# Patient Record
Sex: MALE
[De-identification: names, ages, dates, MRNs, and addresses within clinical notes are randomized per-mention and may not be internally consistent; named-entity substitution may affect disease eponyms.]

---

## 2020-06-22 PROBLEM — Z00.00 ENCOUNTER FOR PREVENTIVE HEALTH EXAMINATION: Status: ACTIVE | Noted: 2020-06-22

## 2020-08-12 ENCOUNTER — APPOINTMENT (OUTPATIENT)
Dept: UROLOGY | Facility: CLINIC | Age: 80
End: 2020-08-12
Payer: COMMERCIAL

## 2020-08-12 VITALS — BODY MASS INDEX: 25.16 KG/M2 | HEIGHT: 68 IN | WEIGHT: 166 LBS

## 2020-08-12 DIAGNOSIS — R35.0 FREQUENCY OF MICTURITION: ICD-10-CM

## 2020-08-12 DIAGNOSIS — N40.1 BENIGN PROSTATIC HYPERPLASIA WITH LOWER URINARY TRACT SYMPMS: ICD-10-CM

## 2020-08-12 DIAGNOSIS — N13.8 BENIGN PROSTATIC HYPERPLASIA WITH LOWER URINARY TRACT SYMPMS: ICD-10-CM

## 2020-08-12 DIAGNOSIS — R97.20 ELEVATED PROSTATE, SPECIFIC ANTIGEN [PSA]: ICD-10-CM

## 2020-08-12 DIAGNOSIS — N40.0 BENIGN PROSTATIC HYPERPLASIA WITHOUT LOWER URINARY TRACT SYMPMS: ICD-10-CM

## 2020-08-12 PROCEDURE — 99203 OFFICE O/P NEW LOW 30 MIN: CPT

## 2020-08-12 NOTE — ASSESSMENT
[FreeTextEntry1] : Discuss fully significant elevation of PSA. I discussed with him the possibility of histological but not clinically significant prostate cancer and also possibly progressive prostate cancer. I discussed with him continued observation which she has been doing in the past versus biopsy now. Risk of biopsy includes sepsis and bleeding. He has to stop aspirin one week prior, with need urine culture prior, would need preprocedure antibiotics and enema. I discussed with him that this is an office procedure done under local anesthesia with periprostatic nerve block.  Patient says he wants to speak to his son who is a physician according to the patient and will get back to me.  He declines any biopsy at this time

## 2020-08-12 NOTE — HISTORY OF PRESENT ILLNESS
[FreeTextEntry1] : 79-year-old with a rising PSA. It is being followed expectantly but now the PSA is quite high at 11.9 compared to 7.7 and 2016. There is no bone pain. He has nocturia x3 but good urinary flow without daytime frequency or urgency, no incontinence. There is no flank pain, no dysuria, no hematuria.\par \par Patient's comorbidities include coronary artery disease and stable angina. He is on aspirin, lisinopril and a statin

## 2020-08-12 NOTE — REVIEW OF SYSTEMS
[Fever] : no fever [Nasal Discharge] : no nasal discharge [Shortness Of Breath] : no shortness of breath [Chest Pain] : no chest pain [Sore Throat] : no sore throat [Constipation] : no constipation [Diarrhea] : no diarrhea [Cough] : no cough [Confused] : no confusion [Dysuria] : no dysuria

## 2020-08-12 NOTE — PHYSICAL EXAM
[Normal Appearance] : normal appearance [General Appearance - In No Acute Distress] : no acute distress [] : no respiratory distress [Respiration, Rhythm And Depth] : normal respiratory rhythm and effort [No Prostate Nodules] : no prostate nodules [Abdomen Soft] : soft [Prostate Tenderness] : the prostate was not tender [Prostate Size ___ (0-4)] : prostate size [unfilled] (scale: 0-4) [Normal Station and Gait] : the gait and station were normal for the patient's age [Oriented To Time, Place, And Person] : oriented to person, place, and time